# Patient Record
Sex: FEMALE | Race: WHITE | URBAN - METROPOLITAN AREA
[De-identification: names, ages, dates, MRNs, and addresses within clinical notes are randomized per-mention and may not be internally consistent; named-entity substitution may affect disease eponyms.]

---

## 2019-11-07 ENCOUNTER — Encounter (OUTPATIENT)
Dept: URBAN - METROPOLITAN AREA CLINIC 71 | Facility: CLINIC | Age: 59
End: 2019-11-07
Payer: COMMERCIAL

## 2019-11-07 PROCEDURE — 92004 COMPRE OPH EXAM NEW PT 1/>: CPT | Performed by: OPHTHALMOLOGY

## 2019-11-27 ENCOUNTER — Encounter (OUTPATIENT)
Dept: URBAN - METROPOLITAN AREA CLINIC 71 | Facility: CLINIC | Age: 59
End: 2019-11-27
Payer: COMMERCIAL

## 2019-11-27 PROCEDURE — 99213 OFFICE O/P EST LOW 20 MIN: CPT | Performed by: OPHTHALMOLOGY

## 2021-03-05 ENCOUNTER — OFFICE VISIT (OUTPATIENT)
Dept: URBAN - METROPOLITAN AREA CLINIC 75 | Facility: CLINIC | Age: 61
End: 2021-03-05
Payer: COMMERCIAL

## 2021-03-05 DIAGNOSIS — H25.13 AGE-RELATED NUCLEAR CATARACT, BILATERAL: ICD-10-CM

## 2021-03-05 DIAGNOSIS — H35.033 HYPERTENSIVE RETINOPATHY, BILATERAL: ICD-10-CM

## 2021-03-05 DIAGNOSIS — H52.4 PRESBYOPIA: Primary | ICD-10-CM

## 2021-03-05 PROCEDURE — 92004 COMPRE OPH EXAM NEW PT 1/>: CPT | Performed by: OPTOMETRIST

## 2021-03-05 ASSESSMENT — INTRAOCULAR PRESSURE
OD: 15
OS: 16

## 2021-03-05 ASSESSMENT — VISUAL ACUITY
OS: 20/20
OD: 20/20

## 2021-03-05 NOTE — IMPRESSION/PLAN
Impression: Hypertensive retinopathy, bilateral: H35.033. Plan: Emphasized blood pressure, cholesterol and sugar control.

## 2021-06-25 ENCOUNTER — OFFICE VISIT (OUTPATIENT)
Dept: URBAN - METROPOLITAN AREA CLINIC 71 | Facility: CLINIC | Age: 61
End: 2021-06-25
Payer: COMMERCIAL

## 2021-06-25 PROCEDURE — 99202 OFFICE O/P NEW SF 15 MIN: CPT | Performed by: OPHTHALMOLOGY

## 2021-06-25 PROCEDURE — 92134 CPTRZ OPH DX IMG PST SGM RTA: CPT | Performed by: OPHTHALMOLOGY

## 2021-06-25 ASSESSMENT — KERATOMETRY
OD: 45.13
OS: 45.38

## 2021-06-25 ASSESSMENT — INTRAOCULAR PRESSURE
OS: 15
OD: 12

## 2021-06-25 NOTE — IMPRESSION/PLAN
Impression: Age-related nuclear cataract, bilateral: H25.13. Stable. No visually impairing patient at this time. Plan: No treatment currently recommended. The patient will monitor vision changes and contact us with any decrease in vision. Patient to return annually for dilated exams.

## 2022-05-10 ENCOUNTER — OFFICE VISIT (OUTPATIENT)
Dept: URBAN - METROPOLITAN AREA CLINIC 71 | Facility: CLINIC | Age: 62
End: 2022-05-10
Payer: COMMERCIAL

## 2022-05-10 DIAGNOSIS — H52.4 PRESBYOPIA: ICD-10-CM

## 2022-05-10 DIAGNOSIS — H25.13 AGE-RELATED NUCLEAR CATARACT, BILATERAL: Primary | ICD-10-CM

## 2022-05-10 PROCEDURE — 99214 OFFICE O/P EST MOD 30 MIN: CPT | Performed by: OPTOMETRIST

## 2022-05-10 ASSESSMENT — INTRAOCULAR PRESSURE
OD: 13
OS: 13

## 2022-05-10 ASSESSMENT — VISUAL ACUITY
OS: 20/20
OD: 20/20

## 2022-05-10 NOTE — IMPRESSION/PLAN
Impression: Presbyopia: H52.4. Plan: Presbyopia is the inability to focus on objects (ie: accommodate) due to the loss of flexibility of your natural lens. Presbyopia occurs with age. Reading glasses, bifocals, trifocals or contacts can be helpful. Contact the office if difficulty focusing persists despite corrective eye wear. New glasses RX given today for progressives. Discussed changes in the prescription compared to her old glasses RX. Boosting add from a +2.00 to +2.25.

## 2022-05-10 NOTE — IMPRESSION/PLAN
Impression: Age-related nuclear cataract, bilateral: H25.13. Plan: Cataracts are mild and not interfering with her vision. No treatment currently recommended. The patient will monitor vision changes and contact us with any decrease in vision.

## 2023-05-31 ENCOUNTER — OFFICE VISIT (OUTPATIENT)
Dept: URBAN - METROPOLITAN AREA CLINIC 71 | Facility: CLINIC | Age: 63
End: 2023-05-31
Payer: COMMERCIAL

## 2023-05-31 DIAGNOSIS — H52.4 PRESBYOPIA: ICD-10-CM

## 2023-05-31 DIAGNOSIS — H25.13 AGE-RELATED NUCLEAR CATARACT, BILATERAL: Primary | ICD-10-CM

## 2023-05-31 DIAGNOSIS — H04.123 DRY EYE SYNDROME OF BILATERAL LACRIMAL GLANDS: ICD-10-CM

## 2023-05-31 PROCEDURE — 99213 OFFICE O/P EST LOW 20 MIN: CPT | Performed by: OPTOMETRIST

## 2023-05-31 ASSESSMENT — VISUAL ACUITY
OS: 20/20
OD: 20/20

## 2023-05-31 ASSESSMENT — INTRAOCULAR PRESSURE
OD: 11
OS: 13

## 2023-05-31 NOTE — IMPRESSION/PLAN
Impression: Age-related nuclear cataract, bilateral: H25.13. NS Trace OU Stable Optos done today to view Retina past NS Retina and vessels normal Plan: Cataracts account for the patient's complaints. No treatment currently recommended. The patient will monitor vision changes and contact us with any decrease in vision.

## 2023-05-31 NOTE — IMPRESSION/PLAN
Impression: Presbyopia: H52.4. DVA 20/20 NVA 20/20 Plan: New PAL RX dispensed. New Occupational BF Dispensed.

## 2023-05-31 NOTE — IMPRESSION/PLAN
Impression: Dry eye syndrome of bilateral lacrimal glands: H04.123. Patient a little bothered by dryness. OS is worse than OD. Plan: Dry eye syndrome requires lubrication of the eye with artificial tears and nighttime ointments or gels. In addition, topical and oral anti-inflammatory medications are usually necessary to treat the associated ocular irritation. Contact the office if dry eye does not improve, worsens or blurs vision. Recommend 3-4 drops daily of Systane Complete.

## 2024-11-05 ENCOUNTER — OFFICE VISIT (OUTPATIENT)
Dept: URBAN - METROPOLITAN AREA CLINIC 71 | Facility: CLINIC | Age: 64
End: 2024-11-05
Payer: COMMERCIAL

## 2024-11-05 DIAGNOSIS — H25.13 AGE-RELATED NUCLEAR CATARACT, BILATERAL: Primary | ICD-10-CM

## 2024-11-05 DIAGNOSIS — H52.4 PRESBYOPIA: ICD-10-CM

## 2024-11-05 DIAGNOSIS — H04.123 DRY EYE SYNDROME OF BILATERAL LACRIMAL GLANDS: ICD-10-CM

## 2024-11-05 PROCEDURE — 99213 OFFICE O/P EST LOW 20 MIN: CPT | Performed by: OPTOMETRIST

## 2024-11-05 ASSESSMENT — VISUAL ACUITY
OD: 20/20
OS: 20/20

## 2024-11-05 ASSESSMENT — INTRAOCULAR PRESSURE
OS: 12
OD: 9